# Patient Record
Sex: MALE | Race: WHITE
[De-identification: names, ages, dates, MRNs, and addresses within clinical notes are randomized per-mention and may not be internally consistent; named-entity substitution may affect disease eponyms.]

---

## 2018-11-10 ENCOUNTER — HOSPITAL ENCOUNTER (EMERGENCY)
Dept: HOSPITAL 80 - FED | Age: 28
Discharge: HOME | End: 2018-11-10
Payer: SELF-PAY

## 2018-11-10 VITALS — SYSTOLIC BLOOD PRESSURE: 127 MMHG | DIASTOLIC BLOOD PRESSURE: 71 MMHG

## 2018-11-10 DIAGNOSIS — N50.9: Primary | ICD-10-CM

## 2018-11-10 DIAGNOSIS — N43.3: ICD-10-CM

## 2018-11-10 LAB — PLATELET # BLD: 228 10^3/UL (ref 150–400)

## 2018-11-12 LAB — GC AMPLIFICATION GENPROBE: NEGATIVE

## 2019-02-01 ENCOUNTER — HOSPITAL ENCOUNTER (OUTPATIENT)
Dept: HOSPITAL 80 - FIMAGING | Age: 29
End: 2019-02-01
Attending: RADIOLOGY
Payer: COMMERCIAL

## 2019-02-01 DIAGNOSIS — C62.12: Primary | ICD-10-CM

## 2019-02-01 PROCEDURE — 36573 INSJ PICC RS&I 5 YR+: CPT

## 2019-02-01 PROCEDURE — 77001 FLUOROGUIDE FOR VEIN DEVICE: CPT

## 2019-02-01 PROCEDURE — 02HV33Z INSERTION OF INFUSION DEVICE INTO SUPERIOR VENA CAVA, PERCUTANEOUS APPROACH: ICD-10-PCS | Performed by: RADIOLOGY

## 2019-02-01 PROCEDURE — C1751 CATH, INF, PER/CENT/MIDLINE: HCPCS

## 2019-02-12 ENCOUNTER — HOSPITAL ENCOUNTER (EMERGENCY)
Dept: HOSPITAL 80 - FED | Age: 29
Discharge: HOME | End: 2019-02-12
Payer: COMMERCIAL

## 2019-02-12 VITALS — SYSTOLIC BLOOD PRESSURE: 101 MMHG | DIASTOLIC BLOOD PRESSURE: 69 MMHG

## 2019-02-12 DIAGNOSIS — C62.90: ICD-10-CM

## 2019-02-12 DIAGNOSIS — Z45.2: Primary | ICD-10-CM

## 2019-02-12 NOTE — EDPHY
H & P


Time Seen by Provider: 02/12/19 10:08


HPI/ROS: 





CHIEF COMPLAINT:  PICC line removal





HISTORY OF PRESENT ILLNESS:  Patient is a 28-year-old male with history of 

testicular cancer here requesting removal of PICC line.  He had a PICC line 

placed for chemotherapy.  He is followed by Dr. Vaughn for his testicular cancer.  

Reports that 3 months ago Dr. Linda removed the testicle and he was on 

chemotherapy for preventative measures.  He did develop some pulmonary 

"inflammation" and Dr. Vaughn was recommending further treatment with chemotherapy 

but decision was made with Dr. Vaughn according to the patient that he had the 

option of pulling the PICC line and doing a "aggressive 3 month watch "with re-

evaluation by CT scan in 3 months to consider further chemotherapy or other 

treatments.  Patient would like to PICC line removed and will follow up with 

Dr. Vaughn.  Denies any fever, chills, shortness of breath, hemoptysis, leg 

swelling.  He reports he was seen by pulmonology last week and they reported 

that he had "amazing lungs "that there were no concerns.





REVIEW OF SYSTEMS:


Constitutional:  No fever, no chills.


Eyes:  No discharge.


ENT:  No sore throat.


Cardiovascular:  No chest pain, no palpitations.


Respiratory:  No cough, no shortness of breath.


Gastrointestinal:  No abdominal pain, no vomiting.


Genitourinary:  No hematuria.


Musculoskeletal:  No back pain.


Skin:  No rashes.


Neurological:  No headache.


Smoking Status: Current every day smoker


Physical Exam: 





General Appearance:  Alert and no distress.


ENT:  normal dentition.  No tonsillar exudate or swelling.


Eyes:  Pupils equal and round no injection.


Respiratory:  Chest is nontender, lungs are clear to auscultation.


Cardiac:  regular rate and rhythm.  No lower extremity edema


Gastrointestinal:  Abdomen is soft and nontender, no masses, bowel sounds 

normal.


Musculoskeletal:  Neck is supple and nontender.


Extremities have full range of motion and are nontender without deformity


Skin:  No rashes or lesions.  PICC line in place to right upper extremity


Neuro:  Cranial nerves grossly intact.   Ambulatory.





Constitutional: 


 Initial Vital Signs











Temperature (C)  36.6 C   02/12/19 09:46


 


Heart Rate  93   02/12/19 09:46


 


Respiratory Rate  16   02/12/19 09:46


 


Blood Pressure  102/70   02/12/19 09:46


 


O2 Sat (%)  97   02/12/19 09:46








 











O2 Delivery Mode               Room Air














Allergies/Adverse Reactions: 


 





No Known Allergies Allergy (Unverified 11/11/12 13:27)


 








Home Medications: 














 Medication  Instructions  Recorded


 


Hydrocodone Bit/Acetaminophen 1 - 2 tab PO Q4-6PRN PRN #11 tab 11/11/12





[Vicodin 5/500]  


 


Miscellaneous Medical Supply [NO 1 ea MISC AD 11/11/12





HOME MEDS]  


 


Penicillin V Potassium [Pen Vk] 500 mg PO TID #21 tab 11/11/12


 


Hydrocodone/APAP 5/325 [Norco 1 tab PO Q6 PRN #7 tab 11/10/18





5/325 (RX)]  














Medical Decision Making


ED Course/Re-evaluation: 





Patient here for PICC line removal.  According to the patient has had an 

extensive conversation about removal of PICC line with Dr. Vaughn and they have 

agreed with plan to remove the PICC line and observing for the next 3 months 

the CT scan in 3 months.  PICC line was removed without complication he was 

observed for 30 min after this happened and the bleeding was well controlled 

and there were no complications.  Indications for return to the emergency room 

were discussed.  He will follow up with Dr. Vaughn for further management and 

observation.





Departure





- Departure


Disposition: Home, Routine, Self-Care


Clinical Impression: 


 PIC line (peripherally inserted central catheter) removal





Condition: Good


Instructions:  Peripherally Inserted Central Catheters and Midline Catheters 

in... (DC)


Additional Instructions: 


Follow-up with Dr. Vaughn for further management and or discussion of observation 

for testicular cancer


Referrals: 


Shy Vaughn MD [Primary Care Provider] - As per Instructions

## 2020-08-10 NOTE — EDPHY
H & P


Time Seen by Provider: 11/10/18 09:45


HPI/ROS: 





CHIEF COMPLAINT:  Testicle pain





HISTORY OF PRESENT ILLNESS: 28-year-old male via private vehicle complaining of 

1 month of left testicle pain worse in the past 1 week.  No trauma.  No new 

sexual partners.  No urethral discharge.  No dysuria hematuria increased 

frequency.  No abdominal pain.  No perineal pain.  No pain with defecation.  No 

unexpected weight loss.  No fever or chills.  No adenopathy.  





PRIMARY CARE PROVIDER:  Deb





REVIEW OF SYSTEMS:


10 systems reviewed and negative with the exception of the elements mentioned 

in the history of present illness








PAST MEDICAL & SURGICAL  HISTORY:  No pertinent medical or surgical history    





SOCIAL HISTORY:














************


PHYSICAL EXAM





(Prior to examination, patient consented to physical exam, hands were washed 

and my usual and customary physical exam procedures followed)


1) GENERAL: Well-developed, well-nourished, alert and oriented.  Appears 

nontoxic


2) HEAD: Normocephalic, atraumatic


3) HEENT: Sclera anicteric. 


4) NECK: Full range of motion, no meningeal signs.


5) LUNGS: Clear auscultation bilaterally, no wheezes, no rhonchi, no 

retractions.   


6) HEART: Regular rate and rhythm, no murmur, no heave, no gallop.


7) ABDOMEN: No guarding, no rebound, no focal tenderness, negative McBurney's, 

negative Alvarez's, negative Rovsing's, negative peritoneal sign,


8) MUSCULOSKELETAL: Moving all extremities, no focal areas of tenderness, no 

obvious trauma.  No peripheral edema or discoloration.


9) BACK: No CVA tenderness, no midline vertebral tenderness, no fluctuance, no 

step-off, no obvious trauma, no visual or palpable abnormality. 


10) SKIN: No rash, no petechiae. 


11) :  Circumcised.  left testicle firm, tender mass.  No overlying scrotal 

changes.  Perineum nontender.  No crepitus.  No signs of cellulitis or Emilio'

s gangrene.  No inguinal mass..








***************





DIFFERENTIAL DIAGNOSIS:  Testicular pain including but not limited to 

epididymitis, orchitis, malignancy, referred pain from kidney stone, inguinal 

hernia, and torsion of the testicle. 





Constitutional: 


 Initial Vital Signs











Temperature (C)  36.9 C   11/10/18 09:48


 


Heart Rate  64   11/10/18 09:48


 


Respiratory Rate  16   11/10/18 09:48


 


Blood Pressure  118/90 H  11/10/18 09:48


 


O2 Sat (%)  96   11/10/18 09:48








 











O2 Delivery Mode               Room Air














Allergies/Adverse Reactions: 


 





No Known Allergies Allergy (Unverified 11/11/12 13:27)


 








Home Medications: 














 Medication  Instructions  Recorded


 


Hydrocodone Bit/Acetaminophen 1 - 2 tab PO Q4-6PRN PRN #11 tab 11/11/12





[Vicodin 5/500]  


 


Miscellaneous Medical Supply [NO 1 ea MISC AD 11/11/12





HOME MEDS]  


 


Penicillin V Potassium [Pen Vk] 500 mg PO TID #21 tab 11/11/12


 


Hydrocodone/APAP 5/325 [Norco 1 tab PO Q6 PRN #7 tab 11/10/18





5/325 (RX)]  














Medical Decision Making





- Diagnostics


Imaging Results: 


 Imaging Impressions





Testicular Ultrasound  11/10/18 09:45


Impression:


 


1. There is a 2.7 cm complex solid mass occupying the medial midpole of the 

left testis, worrisome for malignancy. Urologic consultation is suggested.


2. Query left epididymitis.


3. Small left hydrocele.


4. Bilateral testicular microlithiasis.


 


Findings were discussed with PORSHA Live PA-C at 11:18, on 11/10/2018.


 








Images reviewed myself


ED Course/Re-evaluation: 











11:25 a.m.:  Discussion with staff radiologist regarding the patient's 

ultrasound showing a 2.7 cm left testicular mass concerning for possible 

malignancy.  Will consult with Urology.  I discussed the case with secondary 

supervising physician Dr. Michel Schreiber at this time.





Noon:  Consultation with Dr. Queta Linda recommends laboratory studies 

including HCG, LDH, alpha-fetoprotein be ordered on the patient as well as 

staging CT scan .  Today is Saturday.  Dr. Linda would like to see the 

patient in the afternoon on Monday and will plan on more than likely surgery on 

Wednesday.





3:54 p.m.:  Patient CT imaging is completed.  He will does not need to wait for 

the results as these can be followed up by Dr. Queta Linda at his 

appointment on Monday.  Patient feels comfortable being discharged.  Usual 

customary discharge precautions instructions were provided.





- Data Points


Laboratory Results: 


 Laboratory Results





 11/10/18 12:05 





 11/10/18 12:05 





 











  11/10/18 11/10/18 11/10/18





  12:05 12:05 12:05


 


WBC      11.12 10^3/uL H 10^3/uL





     (3.80-9.50) 


 


RBC      5.15 10^6/uL 10^6/uL





     (4.40-6.38) 


 


Hgb      17.0 g/dL g/dL





     (13.7-17.5) 


 


Hct      48.0 % %





     (40.0-51.0) 


 


MCV      93.2 fL fL





     (81.5-99.8) 


 


MCH      33.0 pg pg





     (27.9-34.1) 


 


MCHC      35.4 g/dL g/dL





     (32.4-36.7) 


 


RDW      11.9 % %





     (11.5-15.2) 


 


Plt Count      228 10^3/uL 10^3/uL





     (150-400) 


 


MPV      10.4 fL fL





     (8.7-11.7) 


 


Neut % (Auto)      86.6 % H %





     (39.3-74.2) 


 


Lymph % (Auto)      9.1 % L %





     (15.0-45.0) 


 


Mono % (Auto)      3.7 % L %





     (4.5-13.0) 


 


Eos % (Auto)      0.0 % L %





     (0.6-7.6) 


 


Baso % (Auto)      0.2 % L %





     (0.3-1.7) 


 


Nucleat RBC Rel Count      0.0 % %





     (0.0-0.2) 


 


Absolute Neuts (auto)      9.63 10^3/uL H 10^3/uL





     (1.70-6.50) 


 


Absolute Lymphs (auto)      1.01 10^3/uL 10^3/uL





     (1.00-3.00) 


 


Absolute Monos (auto)      0.41 10^3/uL 10^3/uL





     (0.30-0.80) 


 


Absolute Eos (auto)      0.00 10^3/uL L 10^3/uL





     (0.03-0.40) 


 


Absolute Basos (auto)      0.02 10^3/uL 10^3/uL





     (0.02-0.10) 


 


Absolute Nucleated RBC      0.00 10^3/uL 10^3/uL





     (0-0.01) 


 


Immature Gran %      0.4 % %





     (0.0-1.1) 


 


Immature Gran #      0.05 10^3/uL 10^3/uL





     (0.00-0.10) 


 


Sodium    139 mEq/L mEq/L  





    (135-145)  


 


Potassium    4.4 mEq/L mEq/L  





    (3.3-5.0)  


 


Chloride    105 mEq/L mEq/L  





    ()  


 


Carbon Dioxide    24 mEq/l mEq/l  





    (22-31)  


 


Anion Gap    10 mEq/L mEq/L  





    (6-14)  


 


BUN    9 mg/dL mg/dL  





    (7-23)  


 


Creatinine    0.6 mg/dL L mg/dL  





    (0.7-1.3)  


 


Estimated GFR    > 60   





    


 


Glucose    118 mg/dL H mg/dL  





    ()  


 


Calcium    9.8 mg/dL mg/dL  





    (8.5-10.4)  


 


Lactate Dehydrogenase    608 IU/L IU/L  





    (313-618)  


 


Alpha Fetoprotein  Pending     





    


 


Tumor Marker HCG  Pending     





    


 


Beta HCG, Quant    < 2.39 mIU/mL mIU/mL  





    (0.00-4.83)  


 


Urine Color      





    


 


Urine Appearance      





    


 


Urine pH      





    


 


Ur Specific Gravity      





    


 


Urine Protein      





    


 


Urine Ketones      





    


 


Urine Blood      





    


 


Urine Nitrate      





    


 


Urine Bilirubin      





    


 


Urine Urobilinogen      





    


 


Ur Leukocyte Esterase      





    


 


Urine RBC      





    


 


Urine WBC      





    


 


Ur Epithelial Cells      





    


 


Hyaline Casts      





    


 


Urine Mucus      





    


 


Urine Glucose      





    


 


C.trachomatis RNA (TMA)      





    


 


N.gonorrhoeae RNA (TMA)      





    


 


Serum Collection Date  Pending     





    


 


Gest Age at Draw (Scan)  Pending     





    


 


Gest Age at Draw (Dates)  Pending     





    


 


Gestational Age Used  Pending     





    


 


Est Delivery Date (Scan  Pending     





    


 


Maternal Date of Birth  Pending     





    


 


Maternl Age at Due Date  Pending     





    


 


Maternal Race  Pending     





    


 


Maternal Weight  Pending     





    


 


Maternal Diabetes  Pending     





    


 


Patient Smoking Status  Pending     





    


 


Number of Fetuses  Pending     





    


 


AFP Provider Phone #  Pending     





    


 


AFP Initial or Repeat  Pending     





    


 


Prev NT Defect Preg  Pending     





    


 


AFP MoM  Pending     





    


 


AFP Result Summary  Pending     





    


 


Maternal AFP Interp  Pending     





    


 


AFP Recommend Followup  Pending     





    


 


Pat or Father NTD  Pending     





    


 


NTD Risk Assessment  Pending     





    


 


AFP # of Chorions  Pending     





    


 


Maternal Scrn Comment  Pending     





    














  11/10/18 11/10/18





  10:00 10:00


 


WBC    





   


 


RBC    





   


 


Hgb    





   


 


Hct    





   


 


MCV    





   


 


MCH    





   


 


MCHC    





   


 


RDW    





   


 


Plt Count    





   


 


MPV    





   


 


Neut % (Auto)    





   


 


Lymph % (Auto)    





   


 


Mono % (Auto)    





   


 


Eos % (Auto)    





   


 


Baso % (Auto)    





   


 


Nucleat RBC Rel Count    





   


 


Absolute Neuts (auto)    





   


 


Absolute Lymphs (auto)    





   


 


Absolute Monos (auto)    





   


 


Absolute Eos (auto)    





   


 


Absolute Basos (auto)    





   


 


Absolute Nucleated RBC    





   


 


Immature Gran %    





   


 


Immature Gran #    





   


 


Sodium    





   


 


Potassium    





   


 


Chloride    





   


 


Carbon Dioxide    





   


 


Anion Gap    





   


 


BUN    





   


 


Creatinine    





   


 


Estimated GFR    





   


 


Glucose    





   


 


Calcium    





   


 


Lactate Dehydrogenase    





   


 


Alpha Fetoprotein    





   


 


Tumor Marker HCG    





   


 


Beta HCG, Quant    





   


 


Urine Color  YELLOW   





   


 


Urine Appearance  CLEAR   





   


 


Urine pH  5.0   





   (5.0-7.5)  


 


Ur Specific Gravity  1.020   





   (1.002-1.030)  


 


Urine Protein  NEGATIVE   





   (NEGATIVE)  


 


Urine Ketones  NEGATIVE   





   (NEGATIVE)  


 


Urine Blood  NEGATIVE   





   (NEGATIVE)  


 


Urine Nitrate  NEGATIVE   





   (NEGATIVE)  


 


Urine Bilirubin  NEGATIVE   





   (NEGATIVE)  


 


Urine Urobilinogen  NEGATIVE EU EU  





   (0.2-1.0)  


 


Ur Leukocyte Esterase  NEGATIVE   





   (NEGATIVE)  


 


Urine RBC  NONE SEEN /hpf /hpf  





   (0-3)  


 


Urine WBC  1-3 /hpf /hpf  





   (0-3)  


 


Ur Epithelial Cells  NONE SEEN /lpf /lpf  





   (NONE-1+)  


 


Hyaline Casts  5-15 /lpf /lpf  





   (0-1)  


 


Urine Mucus  TRACE /lpf /lpf  





   (NONE-1+)  


 


Urine Glucose  NEGATIVE   





   (NEGATIVE)  


 


C.trachomatis RNA (TMA)    Pending 





   


 


N.gonorrhoeae RNA (TMA)    Pending 





   


 


Serum Collection Date    





   


 


Gest Age at Draw (Scan)    





   


 


Gest Age at Draw (Dates)    





   


 


Gestational Age Used    





   


 


Est Delivery Date (Scan    





   


 


Maternal Date of Birth    





   


 


Maternl Age at Due Date    





   


 


Maternal Race    





   


 


Maternal Weight    





   


 


Maternal Diabetes    





   


 


Patient Smoking Status    





   


 


Number of Fetuses    





   


 


AFP Provider Phone #    





   


 


AFP Initial or Repeat    





   


 


Prev NT Defect Preg    





   


 


AFP MoM    





   


 


AFP Result Summary    





   


 


Maternal AFP Interp    





   


 


AFP Recommend Followup    





   


 


Pat or Father NTD    





   


 


NTD Risk Assessment    





   


 


AFP # of Chorions    





   


 


Maternal Scrn Comment    





   











Medications Given: 


 








Discontinued Medications





Fentanyl (Sublimaze)  100 mcg IVP EDNOW ONE


   Stop: 11/10/18 12:01


   Last Admin: 11/10/18 12:11 Dose:  100 mcg


Oxycodone/Acetaminophen (Percocet 5/325)  2 tab PO EDNOW ONE


   Stop: 11/10/18 14:23


   Last Admin: 11/10/18 14:23 Dose:  2 tab








Departure





- Departure


Disposition: Home, Routine, Self-Care


Clinical Impression: 


 Testicular mass





Condition: Good


Instructions:  Testicle Pain (ED)


Additional Instructions: 


Return to the ER if you develop fevers, worsening pain or any other symptoms 

that concern you.


Referrals: 


Nick Linda MD [Medical Doctor] - 11/12/18 (Monday morning at 9:00 

a.m. call Dr. Queta Linda's office.  Tell his staff that we spoke and he 

would like to see you in the office later that day.)


Prescriptions: 


Hydrocodone/APAP 5/325 [Norco 5/325 (RX)] 1 tab PO Q6 PRN #7 tab


 PRN Reason: Pain, Severe Urine Pregnancy Test Result: negative Detail Level: None